# Patient Record
Sex: FEMALE | Race: ASIAN | NOT HISPANIC OR LATINO | URBAN - METROPOLITAN AREA
[De-identification: names, ages, dates, MRNs, and addresses within clinical notes are randomized per-mention and may not be internally consistent; named-entity substitution may affect disease eponyms.]

---

## 2020-08-11 ENCOUNTER — EMERGENCY (EMERGENCY)
Facility: HOSPITAL | Age: 40
LOS: 1 days | Discharge: HOME | End: 2020-08-11
Attending: EMERGENCY MEDICINE | Admitting: EMERGENCY MEDICINE
Payer: MEDICAID

## 2020-08-11 VITALS
SYSTOLIC BLOOD PRESSURE: 124 MMHG | DIASTOLIC BLOOD PRESSURE: 84 MMHG | TEMPERATURE: 99 F | RESPIRATION RATE: 18 BRPM | HEART RATE: 86 BPM | OXYGEN SATURATION: 98 %

## 2020-08-11 PROCEDURE — 99283 EMERGENCY DEPT VISIT LOW MDM: CPT

## 2020-08-11 RX ADMIN — Medication 60 MILLIGRAM(S): at 13:37

## 2020-08-11 NOTE — ED PROVIDER NOTE - NSFOLLOWUPINSTRUCTIONS_ED_ALL_ED_FT
Rest, stay hydrated, take all meds as previously prescribed, Followup with your PMD within 2 days for post hospital visit. Also recommend follow up with neurologist for further management of Bell's palsy - referral list provided. Show your doctor and specialists all copies of labs given to you. Return for worsening symptoms, ex. fever, shortness of breath, chest pain, dizziness, palpitations, difficulty speaking, worsening weakness, or any signs of distress etc. Please read all the patient handouts.

## 2020-08-11 NOTE — ED PROVIDER NOTE - EYES, MLM
unable to close left eye fully (see neuro exam). Clear bilaterally, pupils equal, round and reactive to light.

## 2020-08-11 NOTE — ED ADULT TRIAGE NOTE - CHIEF COMPLAINT QUOTE
Patient brought to ER from home by EMS for c/o left sided numbness, left eye twitching, and headache times three days. Pt not able to fully close eye. Patient brought to ER from home by EMS for c/o left sided numbness, left eye twitching, and headache times three days. Pt not able to fully close eye.  Chang [phone]

## 2020-08-11 NOTE — ED PROVIDER NOTE - CLINICAL SUMMARY MEDICAL DECISION MAKING FREE TEXT BOX
pt with lt facial weakness, suspicious for bell's palsy - will send lymes titers and start prednisone and have pt f/u with neuro as outpt

## 2020-08-11 NOTE — ED PROVIDER NOTE - OBJECTIVE STATEMENT
39 y/o F no PMH c/o inability to close left eye and left facial droop since yesterday. Also endorses mild occipital ha. Notes left eye feels very dry. Denies fever, chills, recent illness, CP, SOB, cough, abdominal pain, n/v/d, numbness/tingling to face, numbness/tingling/weakness to extremities, change in vision, confusion, difficulty speaking.

## 2020-08-11 NOTE — ED PROVIDER NOTE - CRANIAL NERVE AND PUPILLARY EXAM
weakness noted to lt face, unable to close left eye, unable to raise left eyebrow; rt side facial nerve intact, normal sensation b/l face

## 2020-08-11 NOTE — ED PROVIDER NOTE - ATTENDING CONTRIBUTION TO CARE
Seen and examined, pt. with L eye and ear c/o since yest., today  noted inc. asymmetry to face. No fall/injury/trauma, no HA/neck pain, no N/V, no dizziness, no change in vision, no ext. c/o. Pt. in ED with dec. L eye closing, dec. L forehead wrinkle, flattened nasolabial fold. No dysarthria, normal pharynx, clear lungs, heart reg, strength 5/5 bilat, ambulating easily.

## 2020-08-12 LAB
B BURGDOR C6 AB SER-ACNC: POSITIVE — HIGH
B BURGDOR IGG+IGM SER-ACNC: 6.67 INDEX — HIGH (ref 0.01–0.89)

## 2020-08-14 NOTE — ED POST DISCHARGE NOTE - RESULT SUMMARY
[facility] Simmons: Lyme disease ab prelim positive. Pt seen for [place] Palsy. Pt called back using [place]  ID [id number], Naomi spencer. Discussed results with pt, will start Doxycycline 100mg BID x 14 days, explained she must follow up with the ID clinic ([phone]), who may give her up to a 28 day course in total. Pt told she must follow up with ID, continue steroids, and start Doxy BID. Aware she must avoid prolonged sun exposure/ use sunscreen due to Doxycycline side effects. All questions answered.

## 2020-08-18 LAB
B BURGDOR AB SER-IMP: NEGATIVE — SIGNIFICANT CHANGE UP
B BURGDOR18KD IGG SER QL IB: SIGNIFICANT CHANGE UP
B BURGDOR23KD IGG SER QL IB: SIGNIFICANT CHANGE UP
B BURGDOR23KD IGM SER QL IB: PRESENT — SIGNIFICANT CHANGE UP
B BURGDOR28KD IGG SER QL IB: SIGNIFICANT CHANGE UP
B BURGDOR30KD IGG SER QL IB: SIGNIFICANT CHANGE UP
B BURGDOR31KD IGG SER QL IB: SIGNIFICANT CHANGE UP
B BURGDOR39KD IGG SER QL IB: SIGNIFICANT CHANGE UP
B BURGDOR39KD IGM SER QL IB: PRESENT — SIGNIFICANT CHANGE UP
B BURGDOR41KD IGG SER QL IB: PRESENT — SIGNIFICANT CHANGE UP
B BURGDOR41KD IGM SER QL IB: PRESENT — SIGNIFICANT CHANGE UP
B BURGDOR45KD IGG SER QL IB: PRESENT — SIGNIFICANT CHANGE UP
B BURGDOR58KD IGG SER QL IB: SIGNIFICANT CHANGE UP
B BURGDOR66KD IGG SER QL IB: SIGNIFICANT CHANGE UP
B BURGDOR93KD IGG SER QL IB: SIGNIFICANT CHANGE UP
LYME WB IGM INTERPRETATION: POSITIVE — SIGNIFICANT CHANGE UP